# Patient Record
Sex: MALE | Race: WHITE | NOT HISPANIC OR LATINO
[De-identification: names, ages, dates, MRNs, and addresses within clinical notes are randomized per-mention and may not be internally consistent; named-entity substitution may affect disease eponyms.]

---

## 2017-03-01 ENCOUNTER — RX RENEWAL (OUTPATIENT)
Age: 72
End: 2017-03-01

## 2017-04-13 ENCOUNTER — APPOINTMENT (OUTPATIENT)
Dept: UROLOGY | Facility: CLINIC | Age: 72
End: 2017-04-13

## 2017-04-13 VITALS
HEART RATE: 73 BPM | HEIGHT: 72 IN | BODY MASS INDEX: 35.62 KG/M2 | DIASTOLIC BLOOD PRESSURE: 73 MMHG | SYSTOLIC BLOOD PRESSURE: 115 MMHG | WEIGHT: 263 LBS | TEMPERATURE: 97.3 F

## 2017-06-07 ENCOUNTER — RX RENEWAL (OUTPATIENT)
Age: 72
End: 2017-06-07

## 2017-06-13 ENCOUNTER — RX RENEWAL (OUTPATIENT)
Age: 72
End: 2017-06-13

## 2017-10-19 ENCOUNTER — APPOINTMENT (OUTPATIENT)
Dept: UROLOGY | Facility: CLINIC | Age: 72
End: 2017-10-19
Payer: MEDICARE

## 2017-10-19 VITALS — SYSTOLIC BLOOD PRESSURE: 103 MMHG | DIASTOLIC BLOOD PRESSURE: 74 MMHG | HEART RATE: 71 BPM | TEMPERATURE: 97.8 F

## 2017-10-19 PROCEDURE — 99213 OFFICE O/P EST LOW 20 MIN: CPT

## 2017-10-20 ENCOUNTER — MOBILE ON CALL (OUTPATIENT)
Age: 72
End: 2017-10-20

## 2018-04-26 ENCOUNTER — APPOINTMENT (OUTPATIENT)
Dept: UROLOGY | Facility: CLINIC | Age: 73
End: 2018-04-26
Payer: MEDICARE

## 2018-04-26 VITALS
WEIGHT: 260 LBS | HEIGHT: 72 IN | SYSTOLIC BLOOD PRESSURE: 121 MMHG | DIASTOLIC BLOOD PRESSURE: 79 MMHG | OXYGEN SATURATION: 99 % | BODY MASS INDEX: 35.21 KG/M2 | HEART RATE: 73 BPM

## 2018-04-26 PROCEDURE — 99213 OFFICE O/P EST LOW 20 MIN: CPT

## 2018-05-20 ENCOUNTER — RX RENEWAL (OUTPATIENT)
Age: 73
End: 2018-05-20

## 2018-10-25 ENCOUNTER — APPOINTMENT (OUTPATIENT)
Dept: UROLOGY | Facility: CLINIC | Age: 73
End: 2018-10-25
Payer: MEDICARE

## 2018-10-25 PROCEDURE — 99213 OFFICE O/P EST LOW 20 MIN: CPT

## 2019-05-02 ENCOUNTER — APPOINTMENT (OUTPATIENT)
Dept: UROLOGY | Facility: CLINIC | Age: 74
End: 2019-05-02
Payer: MEDICARE

## 2019-05-02 VITALS
TEMPERATURE: 97.3 F | WEIGHT: 260 LBS | SYSTOLIC BLOOD PRESSURE: 127 MMHG | BODY MASS INDEX: 35.21 KG/M2 | HEIGHT: 72 IN | HEART RATE: 70 BPM | DIASTOLIC BLOOD PRESSURE: 80 MMHG

## 2019-05-02 PROCEDURE — 99213 OFFICE O/P EST LOW 20 MIN: CPT

## 2019-05-02 NOTE — PHYSICAL EXAM
[Normal Appearance] : normal appearance [General Appearance - Well Nourished] : well nourished [General Appearance - Well Developed] : well developed [Well Groomed] : well groomed [General Appearance - In No Acute Distress] : no acute distress [Bowel Sounds] : normal bowel sounds [Abdomen Soft] : soft [Abdomen Tenderness] : non-tender [Abdomen Mass (___ Cm)] : no abdominal mass palpated [Costovertebral Angle Tenderness] : no ~M costovertebral angle tenderness [Edema] : no peripheral edema [] : no rash [Affect] : the affect was normal [Oriented To Time, Place, And Person] : oriented to person, place, and time [Mood] : the mood was normal [Not Anxious] : not anxious [Normal Station and Gait] : the gait and station were normal for the patient's age [No Focal Deficits] : no focal deficits [FreeTextEntry1] : obese

## 2019-05-02 NOTE — REVIEW OF SYSTEMS
[both] : pain during and after intercourse [denies] : denies pain with orgasm [base] : pain in base of penis [Negative] : Heme/Lymph [see HPI] : see HPI

## 2019-05-02 NOTE — HISTORY OF PRESENT ILLNESS
[FreeTextEntry1] : The patient is a 73-year-old gentleman who was seen in the office today for the above. He is presently on tamsulosin 2 capsules and finasteride. His daytime frequency is the same at 6 times a day with nocturia x1. He denies all other urological and constitutional symptomatology.\par \par His past medical history, surgical history, medication history and allergy history are unchanged.

## 2019-05-02 NOTE — ASSESSMENT
[FreeTextEntry1] : #1) symptomatic obstructive BPH: Continue tamsulosin 2 capsules and finasteride- refills given.\par Patient stated that he brand name medications Flomax and Proscar or more effective than the generic. He therefore is requesting the brand medications and asked if I would call his wife to discuss this with her. She was called at home but there was no answer at this time.\par \par He'll return in 6 months for reevaluation.

## 2019-05-09 ENCOUNTER — MEDICATION RENEWAL (OUTPATIENT)
Age: 74
End: 2019-05-09

## 2019-11-19 ENCOUNTER — APPOINTMENT (OUTPATIENT)
Dept: UROLOGY | Facility: CLINIC | Age: 74
End: 2019-11-19
Payer: MEDICARE

## 2019-11-19 VITALS
DIASTOLIC BLOOD PRESSURE: 77 MMHG | SYSTOLIC BLOOD PRESSURE: 139 MMHG | RESPIRATION RATE: 16 BRPM | HEART RATE: 69 BPM | OXYGEN SATURATION: 97 %

## 2019-11-19 PROCEDURE — 99213 OFFICE O/P EST LOW 20 MIN: CPT

## 2019-11-19 NOTE — PHYSICAL EXAM
[General Appearance - Well Developed] : well developed [Normal Appearance] : normal appearance [General Appearance - Well Nourished] : well nourished [General Appearance - In No Acute Distress] : no acute distress [Well Groomed] : well groomed [Bowel Sounds] : normal bowel sounds [Abdomen Soft] : soft [Abdomen Tenderness] : non-tender [Costovertebral Angle Tenderness] : no ~M costovertebral angle tenderness [Abdomen Mass (___ Cm)] : no abdominal mass palpated [Edema] : no peripheral edema [] : no rash [Oriented To Time, Place, And Person] : oriented to person, place, and time [Mood] : the mood was normal [Affect] : the affect was normal [Not Anxious] : not anxious [No Focal Deficits] : no focal deficits [Normal Station and Gait] : the gait and station were normal for the patient's age [FreeTextEntry1] : obese

## 2019-11-19 NOTE — REVIEW OF SYSTEMS
[Negative] : Heme/Lymph [both] : pain during and after intercourse [see HPI] : see HPI [denies] : denies pain with orgasm [base] : pain in base of penis

## 2020-04-11 ENCOUNTER — RX RENEWAL (OUTPATIENT)
Age: 75
End: 2020-04-11

## 2020-04-14 ENCOUNTER — RX RENEWAL (OUTPATIENT)
Age: 75
End: 2020-04-14

## 2020-05-29 ENCOUNTER — TRANSCRIPTION ENCOUNTER (OUTPATIENT)
Age: 75
End: 2020-05-29

## 2020-06-01 ENCOUNTER — APPOINTMENT (OUTPATIENT)
Dept: UROLOGY | Facility: CLINIC | Age: 75
End: 2020-06-01
Payer: MEDICARE

## 2020-06-01 PROCEDURE — 99213 OFFICE O/P EST LOW 20 MIN: CPT | Mod: 95

## 2020-06-01 RX ORDER — FINASTERIDE 5 MG/1
5 TABLET, FILM COATED ORAL DAILY
Qty: 90 | Refills: 2 | Status: ACTIVE | COMMUNITY
Start: 2020-06-01 | End: 1900-01-01

## 2020-06-01 NOTE — HISTORY OF PRESENT ILLNESS
[Home] : at home, [unfilled] , at the time of the visit. [Other Location: e.g. Home (Enter Location, City,State)___] : at [unfilled] [Verbal consent obtained from patient] : the patient, [unfilled] [Spouse] : spouse [FreeTextEntry1] : The patient-doctor relationship has been established in a face to face fashion via real time video/audio HIPAA compliant communication using telemedicine software.  The patient's identity has been confirmed.  The patient was previously emailed a copy of the telemedicine consent.  They have had a chance to review and has now given verbal consent and has requested care to be assessed and treated through telemedicine and understands there maybe limitations in this process and they may need further follow up care in the office and or hospital settings.   \par  \par Verbal consent given on 06/01/2020 and 9:014AM by Miladys LATISHA SMALLS, ____ (Relationship to the patient). \par \par Presently on flomax 2 caps & proscar. His DTF is the same at 7-8 x/day wo nocturia. He denies all urological & constitutional Sx's,\par \par His PMH, PSH, medication Hx & allergy Hx are unchanged.\par \par \par

## 2020-06-01 NOTE — ASSESSMENT
[FreeTextEntry1] : #1) symptomatic obstructive BPH: Continue tamsulosin 2 capsules and finasteride- refills given.\par Patient stated that he brand name medications Flomax and Proscar or more effective than the generic. He therefore is requesting the brand medications. \par 3/6/20 PSA 0.2\par \par He'll return in 6 months for reevaluation.

## 2020-06-02 ENCOUNTER — APPOINTMENT (OUTPATIENT)
Dept: UROLOGY | Facility: CLINIC | Age: 75
End: 2020-06-02

## 2021-01-09 ENCOUNTER — RX RENEWAL (OUTPATIENT)
Age: 76
End: 2021-01-09

## 2021-01-12 ENCOUNTER — RX RENEWAL (OUTPATIENT)
Age: 76
End: 2021-01-12

## 2021-10-20 ENCOUNTER — APPOINTMENT (OUTPATIENT)
Dept: UROLOGY | Facility: CLINIC | Age: 76
End: 2021-10-20
Payer: MEDICARE

## 2021-10-20 PROCEDURE — 99213 OFFICE O/P EST LOW 20 MIN: CPT | Mod: 95

## 2021-10-20 PROCEDURE — 99449 NTRPROF PH1/NTRNET/EHR 31/>: CPT

## 2021-10-20 NOTE — ASSESSMENT
[FreeTextEntry1] : #1) symptomatic obstructive BPH: Continue tamsulosin 2 capsules and finasteride- refills given.\par Patient stated that he brand name medications Flomax and Proscar or more effective than the generic. He therefore is requesting the brand medications. \par 3/6/20 PSA 0.2\par PSA will be obtained-paperwork mailed to patient.\par He'll return in 3 months for reevaluation.

## 2021-10-20 NOTE — REASON FOR VISIT
[Home] : at home, [unfilled] , at the time of the visit. [Medical Office: ([facility])___] : at the medical office located in  [Spouse] : spouse [Verbal consent obtained from patient] : the patient, [unfilled]

## 2021-10-25 RX ORDER — TAMSULOSIN HYDROCHLORIDE 0.4 MG/1
0.4 CAPSULE ORAL
Qty: 180 | Refills: 2 | Status: ACTIVE | COMMUNITY
Start: 2020-06-01 | End: 1900-01-01

## 2021-10-26 ENCOUNTER — RX CHANGE (OUTPATIENT)
Age: 76
End: 2021-10-26

## 2021-12-01 ENCOUNTER — APPOINTMENT (OUTPATIENT)
Dept: UROLOGY | Facility: CLINIC | Age: 76
End: 2021-12-01
Payer: MEDICARE

## 2021-12-01 VITALS — HEART RATE: 71 BPM | TEMPERATURE: 97.8 F | SYSTOLIC BLOOD PRESSURE: 116 MMHG | DIASTOLIC BLOOD PRESSURE: 71 MMHG

## 2021-12-01 DIAGNOSIS — N40.1 BENIGN PROSTATIC HYPERPLASIA WITH LOWER URINARY TRACT SYMPMS: ICD-10-CM

## 2021-12-01 DIAGNOSIS — R31.0 GROSS HEMATURIA: ICD-10-CM

## 2021-12-01 LAB
BILIRUB UR QL STRIP: ABNORMAL
CLARITY UR: NORMAL
COLLECTION METHOD: NORMAL
GLUCOSE UR-MCNC: NEGATIVE
HCG UR QL: 0.2 EU/DL
HGB UR QL STRIP.AUTO: ABNORMAL
KETONES UR-MCNC: NORMAL
LEUKOCYTE ESTERASE UR QL STRIP: NEGATIVE
NITRITE UR QL STRIP: NEGATIVE
PH UR STRIP: 5
PROT UR STRIP-MCNC: ABNORMAL
SP GR UR STRIP: 1.02

## 2021-12-01 PROCEDURE — 81003 URINALYSIS AUTO W/O SCOPE: CPT | Mod: QW

## 2021-12-01 PROCEDURE — 99214 OFFICE O/P EST MOD 30 MIN: CPT

## 2021-12-01 NOTE — PHYSICAL EXAM
[General Appearance - Well Developed] : well developed [General Appearance - Well Nourished] : well nourished [Normal Appearance] : normal appearance [Well Groomed] : well groomed [General Appearance - In No Acute Distress] : no acute distress [Bowel Sounds] : normal bowel sounds [Abdomen Soft] : soft [Abdomen Tenderness] : non-tender [Abdomen Mass (___ Cm)] : no abdominal mass palpated [Costovertebral Angle Tenderness] : no ~M costovertebral angle tenderness [Urethral Meatus] : meatus normal [Penis Abnormality] : normal circumcised penis [Urinary Bladder Findings] : the bladder was normal on palpation [Scrotum] : the scrotum was normal [Epididymis] : the epididymides were normal [Testes Tenderness] : no tenderness of the testes [Testes Mass (___cm)] : there were no testicular masses [Anus Abnormality] : the anus and perineum were normal [Rectal Exam - Rectum] : digital rectal exam was normal [Prostate Tenderness] : the prostate was not tender [No Prostate Nodules] : no prostate nodules [Prostate Size ___ gm] : prostate size [unfilled] gm [] : no rash [Edema] : no peripheral edema [Normal Station and Gait] : the gait and station were normal for the patient's age [No Focal Deficits] : no focal deficits [FreeTextEntry1] : Memory loss

## 2021-12-01 NOTE — ASSESSMENT
[FreeTextEntry1] : 1) gross hematuria\par BUN, creatinine, CT urogram, urine cytology x3, and office cystoscopy

## 2021-12-01 NOTE — HISTORY OF PRESENT ILLNESS
[FreeTextEntry1] : The patient is a 76-year-old gentleman who was seen today in the office with his wife for the above.  His wife gave the history because of his memory problem can [place] to his hydrocephalus requiring a shunt.  She stated that they were in [place] last weekend and on Saturday, 11/27/2021 he noted gross blood in the urine with his first void that day.  It persisted 2 more times on Saturday and then resolved.  He had been on Eliquis and so his wife called his cardiologist ask if he should hold it and she was told for 24-48 hours.  The urine remained clear Sunday.  Mrs. Adrian call the cardiologist Monday and she was told to restart the Eliquis which she did.  There is no blood Monday or Tuesday but this afternoon when he submitted a urine specimen in the office, it was bloody.  He denies urinary burning, increased frequency, increased urgency, difficulty initiating the stream, straining to urinate, intermittency, the sensation of incomplete bladder emptying, abdominal or flank pain, fever and chills.\par He has been on tamsulosin 2 capsules at bedtime and finasteride for many years for BPH.  His daytime frequency is about 8 times a day with nocturia x2.\par \par His past medical history, surgical history, medication history and allergy history are unchanged.

## 2021-12-02 ENCOUNTER — OUTPATIENT (OUTPATIENT)
Dept: OUTPATIENT SERVICES | Facility: HOSPITAL | Age: 76
LOS: 1 days | End: 2021-12-02
Payer: MEDICARE

## 2021-12-02 ENCOUNTER — RESULT REVIEW (OUTPATIENT)
Age: 76
End: 2021-12-02

## 2021-12-02 ENCOUNTER — APPOINTMENT (OUTPATIENT)
Dept: CT IMAGING | Facility: CLINIC | Age: 76
End: 2021-12-02
Payer: MEDICARE

## 2021-12-02 DIAGNOSIS — R79.89 OTHER SPECIFIED ABNORMAL FINDINGS OF BLOOD CHEMISTRY: ICD-10-CM

## 2021-12-02 DIAGNOSIS — R31.0 GROSS HEMATURIA: ICD-10-CM

## 2021-12-02 LAB
BUN SERPL-MCNC: 25 MG/DL
CREAT SERPL-MCNC: 1.39 MG/DL

## 2021-12-02 PROCEDURE — 74176 CT ABD & PELVIS W/O CONTRAST: CPT | Mod: ME

## 2021-12-02 PROCEDURE — G1004: CPT

## 2021-12-02 PROCEDURE — 74176 CT ABD & PELVIS W/O CONTRAST: CPT | Mod: 26,ME

## 2021-12-05 ENCOUNTER — LABORATORY RESULT (OUTPATIENT)
Age: 76
End: 2021-12-05

## 2021-12-07 LAB
URINE CYTOLOGY: NORMAL
URINE CYTOLOGY: NORMAL

## 2021-12-08 ENCOUNTER — APPOINTMENT (OUTPATIENT)
Dept: ULTRASOUND IMAGING | Facility: CLINIC | Age: 76
End: 2021-12-08
Payer: MEDICARE

## 2021-12-08 ENCOUNTER — OUTPATIENT (OUTPATIENT)
Dept: OUTPATIENT SERVICES | Facility: HOSPITAL | Age: 76
LOS: 1 days | End: 2021-12-08
Payer: MEDICARE

## 2021-12-08 DIAGNOSIS — R31.0 GROSS HEMATURIA: ICD-10-CM

## 2021-12-08 DIAGNOSIS — R79.89 OTHER SPECIFIED ABNORMAL FINDINGS OF BLOOD CHEMISTRY: ICD-10-CM

## 2021-12-08 PROCEDURE — 76770 US EXAM ABDO BACK WALL COMP: CPT | Mod: 26

## 2021-12-08 PROCEDURE — 76770 US EXAM ABDO BACK WALL COMP: CPT

## 2021-12-16 ENCOUNTER — APPOINTMENT (OUTPATIENT)
Dept: UROLOGY | Facility: CLINIC | Age: 76
End: 2021-12-16
Payer: MEDICARE

## 2021-12-16 VITALS — TEMPERATURE: 98 F | DIASTOLIC BLOOD PRESSURE: 65 MMHG | SYSTOLIC BLOOD PRESSURE: 104 MMHG | HEART RATE: 71 BPM

## 2021-12-16 PROCEDURE — 52000 CYSTOURETHROSCOPY: CPT

## 2021-12-17 ENCOUNTER — APPOINTMENT (OUTPATIENT)
Dept: UROLOGY | Facility: CLINIC | Age: 76
End: 2021-12-17
Payer: MEDICARE

## 2021-12-17 VITALS — TEMPERATURE: 98.1 F

## 2021-12-17 DIAGNOSIS — C67.0 MALIGNANT NEOPLASM OF TRIGONE OF BLADDER: ICD-10-CM

## 2021-12-17 PROCEDURE — 99213 OFFICE O/P EST LOW 20 MIN: CPT

## 2021-12-17 NOTE — ASSESSMENT
[FreeTextEntry1] : 75 yo M with positive urine cytology, tumor on office cystoscopy.  TURBT was recommended. Discussed that, under anesthesia, cystoscope is advanced through the urethra. Urethra and bladder will be visualized. Any suspicious masses or lesions will be resected and fulgurated with cautery. We discussed that deeper biopsies may be performed to rule out invasive disease or assess depth of invasion. Risks include bleeding, infection, urinary retention, injury to urethra or bladder, perforation of bladder, and requirement of catheter post operatively. Pt will proceed with procedure as scheduled. \par \par Will plan for bilateral retrograde pyelography to complete work up. Pt agrees.

## 2021-12-17 NOTE — HISTORY OF PRESENT ILLNESS
[FreeTextEntry1] : 76 year old man seen 12/17/2021 with complaint of bladder mass. Pt followed with Dr Rodriguez, recently found to have gross hematuria. Cystoscopy showed 1 cm sessile mass on RIGHT trigone. CT wo contrast and RBUS were done to assess upper tracts, no urogram phase due to CKD. He presents to discuss TURBT. Urine cytology positive for HG UCC x2.

## 2021-12-17 NOTE — PHYSICAL EXAM
[General Appearance - Well Developed] : well developed [General Appearance - Well Nourished] : well nourished [Normal Appearance] : normal appearance [Well Groomed] : well groomed [General Appearance - In No Acute Distress] : no acute distress [Bowel Sounds] : normal bowel sounds [Abdomen Soft] : soft [Abdomen Tenderness] : non-tender [Abdomen Mass (___ Cm)] : no abdominal mass palpated [Costovertebral Angle Tenderness] : no ~M costovertebral angle tenderness [Urethral Meatus] : meatus normal [Penis Abnormality] : normal circumcised penis [Urinary Bladder Findings] : the bladder was normal on palpation [Scrotum] : the scrotum was normal [Epididymis] : the epididymides were normal [Testes Tenderness] : no tenderness of the testes [Anus Abnormality] : the anus and perineum were normal [Testes Mass (___cm)] : there were no testicular masses [Rectal Exam - Rectum] : digital rectal exam was normal [Prostate Tenderness] : the prostate was not tender [No Prostate Nodules] : no prostate nodules [Prostate Size ___ gm] : prostate size [unfilled] gm [Edema] : no peripheral edema [] : no respiratory distress [Respiration, Rhythm And Depth] : normal respiratory rhythm and effort [Exaggerated Use Of Accessory Muscles For Inspiration] : no accessory muscle use [FreeTextEntry1] : Memory loss [Normal Station and Gait] : the gait and station were normal for the patient's age [No Focal Deficits] : no focal deficits

## 2022-01-19 ENCOUNTER — APPOINTMENT (OUTPATIENT)
Dept: UROLOGY | Facility: HOSPITAL | Age: 77
End: 2022-01-19

## 2022-01-31 ENCOUNTER — APPOINTMENT (OUTPATIENT)
Dept: UROLOGY | Facility: CLINIC | Age: 77
End: 2022-01-31